# Patient Record
Sex: FEMALE | Race: OTHER | Employment: OTHER | URBAN - METROPOLITAN AREA
[De-identification: names, ages, dates, MRNs, and addresses within clinical notes are randomized per-mention and may not be internally consistent; named-entity substitution may affect disease eponyms.]

---

## 2018-10-01 NOTE — PATIENT DISCUSSION
GLAUCOMA MEDICATION: PATIENT IOPs WENT DOWN OU WITH COMBIGAN INSTILLED ON EXAM TODAY. WILL TRIAL LATANOPROST 0.005% QHS OU. INSTRUCTED AND EDUCATED PATIENT ABOUT IMPORTANCE OF COMPLIANCE.

## 2018-10-01 NOTE — PATIENT DISCUSSION
MYOPIA OU: Educated patient about diagnosis. New spectacle prescription given to patient. Will continue to monitor yearly.

## 2018-10-01 NOTE — PATIENT DISCUSSION
GLAUCOMA DIAGNOSIS:  I have explained to the patient at length regarding the diagnosis of glaucoma and its pathophysiology. I have explained that damage to the optic nerve from glaucoma is irreversible and that the available treatments for glaucoma are designed to prevent further damage. I have explained that it is important to lower the intraocular pressure as part of the prevention of additional visual field loss. I have discussed the various treatment options for glaucoma, including medications or surgery. I recommend that the patient begin treatment for glaucoma. I emphasized to the patient the importance of compliance with treatment and follow-up appointments. I have answered all questions to the patient's satisfaction.

## 2018-10-01 NOTE — PATIENT DISCUSSION
New Prescription: latanoprost (latanoprost): drops: 0.005% 1 drop at bedtime as directed into both eyes 10-

## 2018-11-09 NOTE — PATIENT DISCUSSION
New Prescription: Combigan (brimonidine-timolol): drops: 0.2-0.5% 1 drop twice a day as directed into both eyes 11-

## 2018-11-09 NOTE — PATIENT DISCUSSION
POAG OU: Visual field completed today. RESULTS ARE PROFOUND. IOP in office is high. Adding Combigan BID OU to Latanoprost QHS OU. Patient never filled Latanoprost. STRESSED that patient needs to fill medications. Referring patient to Dr. Cheikh Sapp for advanced glaucoma care.

## 2018-11-29 NOTE — PATIENT DISCUSSION
Continue: Combigan (brimonidine-timolol): drops: 0.2-0.5% 1 drop twice a day as directed into both eyes 11-

## 2018-11-29 NOTE — PATIENT DISCUSSION
POAG, OU: ELEVATED INTRAOCULAR PRESSURE, OU.  CONTINUE LATANOPROST AND COMIGAN . RETURN FOR FOLLOW AS SCHEDULED.

## 2019-10-14 NOTE — PATIENT DISCUSSION
POAG, OU: IOP NOT AT [facility], OU. DR Christina Lee WANTS PT TO USE DROPS AND CHECK BACK NEXT VISIT TO DECIDE FOR SLT.

## 2019-10-14 NOTE — PATIENT DISCUSSION
New Prescription: Combigan (brimonidine-timolol): drops: 0.2-0.5% 1 drop twice a day into both eyes 10-

## 2019-10-14 NOTE — PATIENT DISCUSSION
POAG, OU: INTRAOCULAR PRESSURE IS WITHIN ACCEPTABLE LIMITS. PT INSTRUCTED TO CONTINUE COMBIGAN AND LATANOPROST AND RETURN FOR FOLLOW-UP AS SCHEDULED.

## 2019-11-06 NOTE — PATIENT DISCUSSION
Continue: Timoptic (timolol maleate): drops: 0.25% 1 drop twice a day as directed into both eyes 10-

## 2019-11-21 NOTE — PATIENT DISCUSSION
INSTILL 1 DROP INTO BOTH EYES TWICE A DAY AS DIRECTED INSTIL 1DROP IN  [facility] MORNING AND BEDTIME

## 2019-11-21 NOTE — PATIENT DISCUSSION
Stopped Today: Timoptic (timolol maleate): drops: 0.25% 1 drop twice a day as directed into both eyes 10-

## 2019-11-21 NOTE — PATIENT DISCUSSION
Stopped Today: brimonidine (brimonidine): drops: 0.15% 1 drop twice a day as directed into both eyes 10-

## 2020-07-23 NOTE — PATIENT DISCUSSION
1.0 with 100 burst instilled 1 drop of prolensa before laser and 1 drop of Alphagan p after laser .  check iop at 2:20pm  iop 26 instilled 1 gtt of Alphagan P

## 2020-11-17 ENCOUNTER — NEW PATIENT EMERGENCY (OUTPATIENT)
Dept: URBAN - METROPOLITAN AREA CLINIC 39 | Facility: CLINIC | Age: 25
End: 2020-11-17

## 2020-11-17 DIAGNOSIS — H15.101: ICD-10-CM

## 2020-11-17 PROCEDURE — 99203 OFFICE O/P NEW LOW 30 MIN: CPT

## 2020-11-17 RX ORDER — PREDNISOLONE ACETATE 10 MG/ML: 1 SUSPENSION/ DROPS OPHTHALMIC

## 2020-11-17 ASSESSMENT — VISUAL ACUITY
OS_CC: 20/20
OD_CC: 20/20

## 2020-11-17 ASSESSMENT — TONOMETRY
OS_IOP_MMHG: 19
OD_IOP_MMHG: 15

## 2020-12-14 ENCOUNTER — FOLLOW UP (OUTPATIENT)
Dept: URBAN - METROPOLITAN AREA CLINIC 40 | Facility: CLINIC | Age: 25
End: 2020-12-14

## 2020-12-14 DIAGNOSIS — H15.101: ICD-10-CM

## 2020-12-14 PROCEDURE — 92012 INTRM OPH EXAM EST PATIENT: CPT

## 2020-12-14 ASSESSMENT — VISUAL ACUITY
OD_CC: 20/20-2
OS_CC: 20/20-1

## 2020-12-14 ASSESSMENT — TONOMETRY
OS_IOP_MMHG: 18
OD_IOP_MMHG: 23

## 2021-01-19 NOTE — PATIENT DISCUSSION
POAG, OU: INTRAOCULAR PRESSURE IS WITHIN ACCEPTABLE LIMITS. PT INSTRUCTED TO CONTINUE TIMOLOL BID OU AND LATANOPROST QHS OU  AND RETURN FOR FOLLOW-UP AS SCHEDULED.

## 2021-04-19 NOTE — PATIENT DISCUSSION
POAG, OU: INTRAOCULAR PRESSURE IS WITHIN ACCEPTABLE LIMITS. PT INSTRUCTED TO CONTINUE LATANOPROST AND TIMOLOL AND RETURN FOR FOLLOW-UP AS SCHEDULED.

## 2023-04-20 NOTE — PATIENT DISCUSSION
Discharge Instructions - Dr. Arndt Carpal Tunnel    Activity:  - You need ADULT supervision for 24 hours after surgery.  - Rest for the remainder of the day.  - Move wrist, elbow and shoulder 10-15 times per hour (while awake).  - Gently make a full fist, then fully extend the fingers 10-15 times per hour (while awake).  - Avoid lifting more than 5-10 lbs, especially in a palm-down position, for 30 days after surgery.    Elevation:  - Keep your hand elevated higher than your heart level for 48-72 hours after surgery, and whenever you notice throbbing.  At night, use pillows to elevate the hand.    Dressings:  - You may notice some bleeding through the dressing during the first 24 hours after surgery.  This is normal and can be covered by gently wrapping a new Ace bandage over the surgical dressing, without taking the original surgical dressing off.  - Keep the surgical dressing clean and dry, and do not take it off.  We will remove it for you in clinic.  Shower with a plastic bag over the dressing to keep it dry.  - The sutures are usually removed at your appointment about 10-14 days after surgery.    Diet/Pain:  - Expect some discomfort in the days after surgery. Use the pain medication you have been prescribed as directed.  - Resume your usual diet as tolerated.  - Your pain medication may impair the mental and physical abilities required for the performance of potentially hazardous tasks such as driving a car, operating machinery or signing legal documents.  - DO NOT drive for a period of 24 hours after surgery  - DO NOT TAKE TYLENOL (acetaminophen) with your prescribed pain medication.    Problems to Watch For: (Call Dr. Arndt if you are having these symptoms at [phone], someone will answer 24 hours a day)  - SEVERE pain not relieved by pain medication  - Temperature over 101 degrees F.  If you have a mild temperature greater than 99 but less than 101, this means you need to cough and deep breathe to  Continue: latanoprost (latanoprost): drops: 0.005% 1 drop at bedtime as directed into both eyes 10- prevent pneumonia  - Heavy bleeding that soaks dressing  - Redness, swelling or pus at incision site once dressings taken down  - Excessive swelling or inability to move fingers  - Numbness or change in color or temperature of fingers that is NOT relieved by ice or elevating hand above heart

## 2023-05-08 ENCOUNTER — COMPREHENSIVE EXAM (OUTPATIENT)
Dept: URBAN - METROPOLITAN AREA CLINIC 40 | Facility: CLINIC | Age: 28
End: 2023-05-08

## 2023-05-08 DIAGNOSIS — H04.123: ICD-10-CM

## 2023-05-08 DIAGNOSIS — H52.203: ICD-10-CM

## 2023-05-08 DIAGNOSIS — H52.01: ICD-10-CM

## 2023-05-08 PROCEDURE — 92014 COMPRE OPH EXAM EST PT 1/>: CPT

## 2023-05-08 PROCEDURE — 92015 DETERMINE REFRACTIVE STATE: CPT

## 2023-05-08 ASSESSMENT — KERATOMETRY
OS_AXISANGLE2_DEGREES: 81
OS_K2POWER_DIOPTERS: 45.75
OD_AXISANGLE_DEGREES: 177
OD_K1POWER_DIOPTERS: 43.75
OD_K2POWER_DIOPTERS: 45.75
OS_AXISANGLE_DEGREES: 171
OD_AXISANGLE2_DEGREES: 87
OS_K1POWER_DIOPTERS: 44.00

## 2023-05-08 ASSESSMENT — TONOMETRY
OS_IOP_MMHG: 19
OD_IOP_MMHG: 18

## 2023-05-08 ASSESSMENT — VISUAL ACUITY
OS_SC: 20/40
OS_SC: J2
OD_SC: 20/30
OU_CC: 20/20
OD_CC: 20/20
OS_CC: J1+
OU_SC: J1
OD_SC: J2
OD_CC: J1
OU_SC: 20/30
OU_CC: J1+
OS_CC: 20/20

## 2023-05-22 ENCOUNTER — CONTACT LENSES/GLASSES VISIT (OUTPATIENT)
Dept: URBAN - METROPOLITAN AREA CLINIC 40 | Facility: CLINIC | Age: 28
End: 2023-05-22

## 2023-05-22 DIAGNOSIS — H52.01: ICD-10-CM

## 2023-05-22 DIAGNOSIS — H52.203: ICD-10-CM

## 2023-05-22 PROCEDURE — 92310-4 LEVEL 4 CONTACT LENS MANAGEMENT

## 2023-05-22 ASSESSMENT — VISUAL ACUITY
OS_CC: J1 CL
OD_CC: 20/30-1 CL
OD_CC: J3 CL
OS_CC: 20/20 CL

## 2023-05-22 ASSESSMENT — KERATOMETRY
OS_K1POWER_DIOPTERS: 44.00
OD_K2POWER_DIOPTERS: 45.75
OD_K1POWER_DIOPTERS: 43.75
OS_K2POWER_DIOPTERS: 45.75
OD_AXISANGLE2_DEGREES: 87
OS_AXISANGLE2_DEGREES: 81
OS_AXISANGLE_DEGREES: 171
OD_AXISANGLE_DEGREES: 177